# Patient Record
Sex: FEMALE | Race: BLACK OR AFRICAN AMERICAN | Employment: FULL TIME | URBAN - METROPOLITAN AREA
[De-identification: names, ages, dates, MRNs, and addresses within clinical notes are randomized per-mention and may not be internally consistent; named-entity substitution may affect disease eponyms.]

---

## 2017-03-24 ENCOUNTER — HOSPITAL ENCOUNTER (OUTPATIENT)
Dept: GENERAL RADIOLOGY | Age: 42
Discharge: HOME | End: 2017-03-24
Payer: MEDICAID

## 2017-03-24 DIAGNOSIS — R61 NIGHT SWEATS: ICD-10-CM

## 2017-03-24 DIAGNOSIS — R07.9 CHEST PAIN: ICD-10-CM

## 2017-03-24 PROCEDURE — 71020 XR CHEST PA LAT: CPT

## 2018-03-14 ENCOUNTER — HOSPITAL ENCOUNTER (EMERGENCY)
Age: 43
Discharge: HOME | End: 2018-03-14
Attending: EMERGENCY MEDICINE | Admitting: EMERGENCY MEDICINE
Payer: SELF-PAY

## 2018-03-14 VITALS
HEART RATE: 100 BPM | SYSTOLIC BLOOD PRESSURE: 157 MMHG | DIASTOLIC BLOOD PRESSURE: 89 MMHG | HEIGHT: 62 IN | WEIGHT: 142 LBS | RESPIRATION RATE: 18 BRPM | BODY MASS INDEX: 26.13 KG/M2 | TEMPERATURE: 100.1 F | OXYGEN SATURATION: 98 %

## 2018-03-14 DIAGNOSIS — R03.0 ELEVATED BLOOD PRESSURE READING: Primary | ICD-10-CM

## 2018-03-14 DIAGNOSIS — Z72.0 TOBACCO USE: ICD-10-CM

## 2018-03-14 PROCEDURE — 99282 EMERGENCY DEPT VISIT SF MDM: CPT

## 2018-03-14 NOTE — ED NOTES
The L.C. spoke with the client over the phone and discussed their follow up options. Due to the client not having insurance at the present they are concerned about medical bills. The [employer] will make an attempt to mail out the Jose L Lua along with the contact number for Call [address] [place] [address] for their minor child.

## 2018-03-14 NOTE — ED PROVIDER NOTES
EMERGENCY DEPARTMENT HISTORY AND PHYSICAL EXAM    12:31 PM      Date: 3/14/2018  Patient Name: Taylor Angulo    History of Presenting Illness     Chief Complaint   Patient presents with    Well Woman         History Provided By: Patient    Chief Complaint: Work note  Duration:  N/A  Timing:  N/A  Location: n/a  Quality: n/a  Severity: N/A  Modifying Factors: none  Associated Symptoms: denies any other associated signs or symptoms      Additional History (Context): Taylor Angulo is a 44 y.o. female with anxiety and PID who presents to the ED to be evaluated for for pre employment physical. 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 Now the pt is trying to apply for another LPN position but she needs a doctor to says she physically and mentally able to work. Reports she does see a therapist Dr. Chelsea Neri; who wrote a note saying the pt is mentally stable to work but he is unable to say if she can physically work. She sees her therapist 3x times a year. She states she didn't take this to a PCP due to her having to leave her last job she no longer has benefits; but she really needs this signed off so she can beable to work again. Otherwise she has been feeling fine and has no complaints of pain. Only medication she is on is xanx 0.5mg BID PO. Admits to being a current smoker for the past 20 years; she now trying to quit has on nicotine patch. Denies any symptoms. No other complaints or concerns in the ED. As the patient is without physical symptoms or complaints of pain, there is no severity of pain, quality of pain, duration, modifying factors, or associated signs and symptoms regarding the pt's presenting complaint. PCP: Vladislav Hernandez MD    Current Outpatient Prescriptions   Medication Sig Dispense Refill    ALPRAZolam (XANAX) 0.5 mg tablet Take 0.5 mg by mouth.          Past History     Past Medical History:  Past Medical History:   Diagnosis Date    Anxiety     Pelvic inflammatory disease     Psychiatric disorder        Past Surgical History:  No past surgical history on file. Family History:  Family History   Problem Relation Age of Onset    Heart Disease Other        Social History:  Social History   Substance Use Topics    Smoking status: Current Every Day Smoker     Packs/day: 1.00    Smokeless tobacco: Not on file    Alcohol use Yes       Allergies:  No Known Allergies      Review of Systems       Review of Systems   Constitutional: Negative for activity change, fatigue and fever. HENT: Negative for congestion and rhinorrhea. Eyes: Negative for visual disturbance. Respiratory: Negative for shortness of breath. Cardiovascular: Negative for chest pain and palpitations. Gastrointestinal: Negative for abdominal pain, diarrhea, nausea and vomiting. Genitourinary: Negative for dysuria and hematuria. Musculoskeletal: Negative for back pain. Skin: Negative for rash. Neurological: Negative for dizziness, weakness and light-headedness. All other systems reviewed and are negative. Physical Exam     Visit Vitals    /89 (BP 1 Location: Left arm, BP Patient Position: At rest)    Pulse 100    Temp 100.1 °F (37.8 °C)    Resp 18    Ht 5' 2\" (1.575 m)    Wt 64.4 kg (142 lb)    SpO2 98%    BMI 25.97 kg/m2         Physical Exam   Constitutional: She is oriented to person, place, and time. She appears well-developed and well-nourished. No distress. HENT:   Head: Normocephalic and atraumatic. Right Ear: External ear normal.   Left Ear: External ear normal.   Nose: Nose normal.   Mouth/Throat: Oropharynx is clear and moist.   Eyes: Conjunctivae and EOM are normal. Pupils are equal, round, and reactive to light. No scleral icterus. Neck: Normal range of motion. Neck supple. No JVD present. No tracheal deviation present. No thyromegaly present.    Cardiovascular: Normal rate, regular rhythm, normal heart sounds and intact distal pulses. Exam reveals no gallop and no friction rub. No murmur heard. Pulmonary/Chest: Effort normal and breath sounds normal. She exhibits no tenderness. Abdominal: Soft. Bowel sounds are normal. She exhibits no distension. There is no tenderness. There is no rebound and no guarding. Musculoskeletal: Normal range of motion. She exhibits no edema or tenderness. Lymphadenopathy:     She has no cervical adenopathy. Neurological: She is alert and oriented to person, place, and time. No cranial nerve deficit. Coordination normal.   No sensory loss, Gait normal, Motor 5/5   Skin: Skin is warm and dry. Psychiatric: She has a normal mood and affect. Her behavior is normal. Judgment and thought content normal.   Denies SI or HI   Nursing note and vitals reviewed. Diagnostic Study Results     Labs -  No results found for this or any previous visit (from the past 12 hour(s)). Radiologic Studies -   No orders to display         Medical Decision Making   I am the first provider for this patient. I reviewed the vital signs, available nursing notes, past medical history, past surgical history, family history and social history. Vital Signs-Reviewed the patient's vital signs. Pulse Oximetry Analysis -  98 on room air (Interpretation) normal.    Records Reviewed: Nursing Notes and Old Medical Records (Time of Review: 12:31 PM)    Provider Notes (Medical Decision Making): Pt is a 38yo female smoker with a hx of PTSD, anxiety that presents with a request for a pre employment evaluations. She notes that she is in a position where she cannot afford a PMD and needs to get to agency work so she felt as this was her only option. Pt is without complaint and was transparent about sharing her previous behavioral records from behavioral provider 4/2017. It appears she was cleared to return to work. Pt has 2 forms to attest to her health.   I can reasonably state that she appears able to work. I have asked her to be seen by a primary provider in the next 30 days for a reevaluation and she agrees. Pt BP is mildly elevated and will need to be followed. The  will assist in arranging her care. Eddie Noguera, DO 12:42 PM    Diagnosis     Clinical Impression:   1. Elevated blood pressure reading    2. Tobacco use        Disposition: D/C home    Follow-up Information     Follow up With Details Comments [address] in 1 week Follow up, As needed [address] [place], [place] [id number]  [address] [place] [address] Call Follow up, As needed [address] [facility]  [phone]           Patient's Medications   Start Taking    No medications on file   Continue Taking    ALPRAZOLAM (XANAX) 0.5 MG TABLET    Take 0.5 mg by mouth. These Medications have changed    No medications on file   Stop Taking    No medications on file     _______________________________    Attestations:  [address] [facility] acting as a scribe for and in the presence of Mckenna Nielson MD      March 14, 2018 at 12:31 PM       Provider Attestation:      I personally performed the services described in the documentation, reviewed the documentation, as recorded by the scribe in my presence, and it accurately and completely records my words and actions.  March 14, 2018 at 12:31 PM - Mckenna Nielson MD    _______________________________

## 2018-03-14 NOTE — DISCHARGE INSTRUCTIONS
Elevated Blood Pressure: Care Instructions  Your Care Instructions    Blood pressure is a measure of how hard the blood pushes against the walls of your arteries. It's normal for blood pressure to go up and down throughout the day. But if it stays up over time, you have high blood pressure. Two numbers tell you your blood pressure. The first number is the systolic pressure. It shows how hard the blood pushes when your heart is pumping. The second number is the diastolic pressure. It shows how hard the blood pushes between heartbeats, when your heart is relaxed and filling with blood. An ideal blood pressure in adults is less than 120/80 (say \"120 over 80\"). High blood pressure is 140/90 or higher. You have high blood pressure if your top number is 140 or higher or your bottom number is 90 or higher, or both. The main test for high blood pressure is simple, fast, and painless. To diagnose high blood pressure, your doctor will test your blood pressure at different times. After testing your blood pressure, your doctor may ask you to test it again when you are home. If you are diagnosed with high blood pressure, you can work with your doctor to make a long-term plan to manage it. Follow-up care is a key part of your treatment and safety. Be sure to make and go to all appointments, and call your doctor if you are having problems. It's also a good idea to know your test results and keep a list of the medicines you take. How can you care for yourself at home? · Do not smoke. Smoking increases your risk for heart attack and stroke. If you need help quitting, talk to your doctor about stop-smoking programs and medicines. These can increase your chances of quitting for good. · Stay at a healthy weight. · Try to limit how much sodium you eat to less than 2,300 milligrams (mg) a day. Your doctor may ask you to try to eat less than 1,500 mg a day. · Be physically active.  Get at least 30 minutes of exercise on most days of the week. Walking is a good choice. You also may want to do other activities, such as running, swimming, cycling, or playing tennis or team sports. · Avoid or limit alcohol. Talk to your doctor about whether you can drink any alcohol. · Eat plenty of fruits, vegetables, and low-fat dairy products. Eat less saturated and total fats. · Learn how to check your blood pressure at home. When should you call for help? Call your doctor now or seek immediate medical care if:  ? · Your blood pressure is much higher than normal (such as 180/110 or higher). ? · You think high blood pressure is causing symptoms such as:  ¨ Severe headache. ¨ Blurry vision. ? Watch closely for changes in your health, and be sure to contact your doctor if:  ? · You do not get better as expected. Where can you learn more? Go to http://hoKidlandiadiana.info/. Enter M124 in the search box to learn more about \"Elevated Blood Pressure: Care Instructions. \"  Current as of: September 21, 2016  Content Version: 11.4  © 0321-1763 [employer]. Care instructions adapted under license by [employer] (which disclaims liability or warranty for this information). If you have questions about a medical condition or this instruction, always ask your healthcare professional. [address] any warranty or liability for your use of this information. Stopping Smoking: Care Instructions  Your Care Instructions    Cigarette smokers crave the nicotine in cigarettes. Giving it up is much harder than simply changing a habit. Your body has to stop craving the nicotine. It is hard to quit, but you can do it. There are many tools that people use to quit smoking. You may find that combining tools works best for you. There are several steps to quitting. First you get ready to quit. Then you get support to help you. After that, you learn new skills and behaviors to become a nonsmoker.  For many people, a necessary step is getting and using medicine. Your doctor will help you set up the plan that best meets your needs. You may want to attend a smoking cessation program to help you quit smoking. When you choose a program, look for one that has proven success. Ask your doctor for ideas. You will greatly increase your chances of success if you take medicine as well as get counseling or join a cessation program.  Some of the changes you feel when you first quit tobacco are uncomfortable. Your body will miss the nicotine at first, and you may feel short-tempered and grumpy. You may have trouble sleeping or concentrating. Medicine can help you deal with these symptoms. You may struggle with changing your smoking habits and rituals. The last step is the tricky one: Be prepared for the smoking urge to continue for a time. This is a lot to deal with, but keep at it. You will feel better. Follow-up care is a key part of your treatment and safety. Be sure to make and go to all appointments, and call your doctor if you are having problems. It's also a good idea to know your test results and keep a list of the medicines you take. How can you care for yourself at home? · Ask your family, friends, and coworkers for support. You have a better chance of quitting if you have help and support. · Join a support group, such as Nicotine Anonymous, for people who are trying to quit smoking. · Consider signing up for a smoking cessation program, such as the American Lung Association's Freedom from Smoking program.  · Set a quit date. Pick your date carefully so that it is not right in the middle of a big deadline or stressful time. Once you quit, do not even take a puff. Get rid of all ashtrays and lighters after your last cigarette. Clean your house and your clothes so that they do not smell of smoke. · Learn how to be a nonsmoker.  Think about ways you can avoid those things that make you reach for a cigarette. ¨ Avoid situations that put you at greatest risk for smoking. For some people, it is hard to have a drink with friends without smoking. For others, they might skip a coffee break with coworkers who smoke. ¨ Change your daily routine. Take a different route to work or eat a meal in a different place. · Cut down on stress. Calm yourself or release tension by doing an activity you enjoy, such as reading a book, taking a hot bath, or gardening. · Talk to your doctor or pharmacist about nicotine replacement therapy, which replaces the nicotine in your body. You still get nicotine but you do not use tobacco. Nicotine replacement products help you slowly reduce the amount of nicotine you need. These products come in several forms, many of them available over-the-counter:  ¨ Nicotine patches  ¨ Nicotine gum and lozenges  ¨ Nicotine inhaler  · Ask your doctor about bupropion (Wellbutrin) or varenicline (Chantix), which are prescription medicines. They do not contain nicotine. They help you by reducing withdrawal symptoms, such as stress and anxiety. · Some people find hypnosis, acupuncture, and massage helpful for ending the smoking habit. · Eat a healthy diet and get regular exercise. Having healthy habits will help your body move past its craving for nicotine. · Be prepared to keep trying. Most people are not successful the first few times they try to quit. Do not get mad at yourself if you smoke again. Make a list of things you learned and think about when you want to try again, such as next week, next month, or next year. Where can you learn more? Go to http://ho-diana.info/. Enter R619 in the search box to learn more about \"Stopping Smoking: Care Instructions. \"  Current as of: March 20, 2017  Content Version: 11.4  © 8316-7163 [employer].  Care instructions adapted under license by [employer] (which disclaims liability or warranty for this information). If you have questions about a medical condition or this instruction, always ask your healthcare professional. [place] [address] any warranty or liability for your use of this information.

## 2018-03-14 NOTE — Clinical Note
Requires reevaluation in 1 week regarding BP and full reevaluation in 30 days in order to continue working.

## 2018-03-14 NOTE — ED TRIAGE NOTES
Pt presents to ED for work note.  Pt reports that she needs to be evaluated for pre-employment physical.

## 2018-03-23 ENCOUNTER — HOSPITAL ENCOUNTER (EMERGENCY)
Age: 43
Discharge: HOME | End: 2018-03-23
Attending: EMERGENCY MEDICINE | Admitting: EMERGENCY MEDICINE
Payer: SELF-PAY

## 2018-03-23 ENCOUNTER — APPOINTMENT (OUTPATIENT)
Dept: GENERAL RADIOLOGY | Age: 43
End: 2018-03-23
Attending: EMERGENCY MEDICINE
Payer: SELF-PAY

## 2018-03-23 VITALS
TEMPERATURE: 98.1 F | SYSTOLIC BLOOD PRESSURE: 125 MMHG | BODY MASS INDEX: 26.13 KG/M2 | HEIGHT: 62 IN | DIASTOLIC BLOOD PRESSURE: 78 MMHG | HEART RATE: 73 BPM | OXYGEN SATURATION: 100 % | RESPIRATION RATE: 16 BRPM | WEIGHT: 142 LBS

## 2018-03-23 DIAGNOSIS — J20.9 ACUTE BRONCHITIS, UNSPECIFIED ORGANISM: Primary | ICD-10-CM

## 2018-03-23 DIAGNOSIS — Z72.0 TOBACCO USE: ICD-10-CM

## 2018-03-23 PROCEDURE — 99283 EMERGENCY DEPT VISIT LOW MDM: CPT

## 2018-03-23 PROCEDURE — 94640 AIRWAY INHALATION TREATMENT: CPT

## 2018-03-23 PROCEDURE — 74011636637 HC RX REV CODE- 636/637: Performed by: EMERGENCY MEDICINE

## 2018-03-23 PROCEDURE — 74011000250 HC RX REV CODE- 250: Performed by: EMERGENCY MEDICINE

## 2018-03-23 PROCEDURE — 71046 X-RAY EXAM CHEST 2 VIEWS: CPT

## 2018-03-23 RX ORDER — PREDNISONE 20 MG/1
60 TABLET ORAL DAILY
Qty: 21 TAB | Refills: 0 | Status: SHIPPED | OUTPATIENT
Start: 2018-03-23 | End: 2018-03-28

## 2018-03-23 RX ORDER — IPRATROPIUM BROMIDE AND ALBUTEROL SULFATE 2.5; .5 MG/3ML; MG/3ML
3 SOLUTION RESPIRATORY (INHALATION)
Status: COMPLETED | OUTPATIENT
Start: 2018-03-23 | End: 2018-03-23

## 2018-03-23 RX ORDER — ALBUTEROL SULFATE 0.83 MG/ML
5 SOLUTION RESPIRATORY (INHALATION)
Status: COMPLETED | OUTPATIENT
Start: 2018-03-23 | End: 2018-03-23

## 2018-03-23 RX ORDER — ALBUTEROL SULFATE 90 UG/1
1 AEROSOL, METERED RESPIRATORY (INHALATION)
Qty: 1 INHALER | Refills: 0 | Status: SHIPPED | OUTPATIENT
Start: 2018-03-23 | End: 2019-12-02

## 2018-03-23 RX ORDER — PREDNISONE 20 MG/1
60 TABLET ORAL
Status: COMPLETED | OUTPATIENT
Start: 2018-03-23 | End: 2018-03-23

## 2018-03-23 RX ORDER — LEVOFLOXACIN 750 MG/1
750 TABLET ORAL DAILY
Qty: 5 TAB | Refills: 0 | Status: SHIPPED | OUTPATIENT
Start: 2018-03-23 | End: 2019-12-02

## 2018-03-23 RX ADMIN — PREDNISONE 60 MG: 20 TABLET ORAL at 10:19

## 2018-03-23 RX ADMIN — ALBUTEROL SULFATE 5 MG: 2.5 SOLUTION RESPIRATORY (INHALATION) at 10:37

## 2018-03-23 RX ADMIN — IPRATROPIUM BROMIDE AND ALBUTEROL SULFATE 3 ML: .5; 3 SOLUTION RESPIRATORY (INHALATION) at 10:37

## 2018-03-23 NOTE — ED PROVIDER NOTES
EMERGENCY DEPARTMENT HISTORY AND PHYSICAL EXAM    10:08 AM      Date: 3/23/2018  Patient Name: Chloé Sawyer    History of Presenting Illness     Chief Complaint   Patient presents with    Cough         History Provided By: Patient    Chief Complaint: Cough   Duration: 3 Weeks  Timing:  Constant  Quality: productive, and white mucus   Severity: Moderate  Modifying Factors: Tried Prednisone, NyQuil, Mucinex, no relief of Sx   Associated Symptoms: fatigue, postnasal drip       Additional History (Context): Chloé Sawyer is a 44 y.o. female with PMHx of anxiety who presents c/o a constant moderate productive cough onset x 3 weeks ago. Pt states the color of the for the past x 3 weeks she has been \"fighting a cold\". Associated Sx include fatigue and postnasal drip. Pt states she has an inhaler and Prednisone. On Prednisone x 5 days. She also tried NyQuil and Mucinex, no relief of Sx. Last dose of Mucinex was \"this morning\". Pt admits that she is a 20 year smoker. Denies any further complaints or symptoms at the moment. PCP: Vladislav Hernandez MD        Past History     Past Medical History:  Past Medical History:   Diagnosis Date    Anxiety     Pelvic inflammatory disease     Psychiatric disorder        Past Surgical History:  History reviewed. No pertinent surgical history. Family History:  Family History   Problem Relation Age of Onset    Heart Disease Other        Social History:  Social History   Substance Use Topics    Smoking status: Current Every Day Smoker     Packs/day: 1.00    Smokeless tobacco: None    Alcohol use Yes       Allergies:  No Known Allergies      Review of Systems       Review of Systems   Constitutional: Positive for fatigue. HENT: Positive for postnasal drip. Respiratory: Positive for cough. All other systems reviewed and are negative.         Physical Exam     Visit Vitals    /78    Pulse 73    Temp 98.1 °F (36.7 °C)    Resp 16    Ht 5' 2\" (1.575 m)    Wt 64.4 kg (142 lb)    LMP 03/14/2018    SpO2 100%    BMI 25.97 kg/m2         Physical Exam   Constitutional:   General:  Well-developed, well-nourished, appears mildly distressed, not warm to touch nontoxic nondiaphoretic. Head:  Normocephalic atraumatic. Eyes:  Pupils midrange extraocular movements intact. No pallor or conjunctival injection. Nose:  No rhinorrhea, inspection grossly normal.    Ears:  Grossly normal to inspection, no discharge. Mouth:  Mucous membranes moist, no appreciable intraoral lesion. Neck/Back:  Trachea midline, no asymmetry. Chest:  Grossly normal inspection, symmetric chest rise. Pulmonary: Wheezing all lung fields no focal rhonchi speaking in full sentences. Cardiovascular:  S1-S2 no murmurs rubs or gallops. Abdomen: Soft, nontender, nondistended no guarding rebound or peritoneal signs. Extremities:  Grossly normal to inspection, peripheral pulses intact  no distal edema no pain on palpation and No asymmetry  Neurologic:  Alert and oriented no appreciable focal neurologic deficit. Skin:  Warm and dry  Psychiatric:  Grossly normal mood and affect. Nursing note reviewed, vital signs reviewed. Diagnostic Study Results     Labs -  No results found for this or any previous visit (from the past 12 hour(s)). Radiologic Studies -   XR CHEST PA LAT   Final Result      CXR  IMPRESSION:  No acute findings. Medical Decision Making   I am the first provider for this patient. I reviewed the vital signs, available nursing notes, past medical history, past surgical history, family history and social history. Vital Signs-Reviewed the patient's vital signs.     Pulse Oximetry Analysis -  100 % on RA, normal     Records Reviewed: Nursing Notes (Time of Review: 10:08 AM)    ED Course: Progress Notes, Reevaluation, and Consults:    ED course:  Patient presents with persistent cough ×1 month, with productive sputum, difficulty getting her secretions up in the morning. She is afebrile and not tachycardic saturation normal on room air, clinically he has a COPD exacerbation, no history of COPD, may have a bronchitis but more likely it's related to her 20 year smoking history. She doesn't have a primary care doctor, she is working with LEFT coached to establish care but she acknowledges that she has type and screen financial constraint. She will be referred back to , chris Lacey that she understands the importance of follow-up    Offered an IV line she declined she did not have blood work done she requested only have breathing treatments steroids by mouth and a chest x-ray. It seems reasonable    Auscultation she has persistent wheezing although improved aeration. Discussed further breathing treatment she declined that at this time. Discussed need for outpatient management for diagnosis of COPD or heart failure. She did not want a 1st evaluation here which seems reasonable, we'll discharge with albuterol steroids and Levaquin for possible bacterial exacerbation of COPD with active tobacco use    Patient's presentation, history, physical exam and laboratory evaluations were reviewed. At this time patient was felt to be stable for outpatient management and follow with primary care/specialist.  Patient was instructed to return to the emergency department with any concerns. Disposition:    Discharged home      Portions of this chart were created with Dragon medical speech to text program.   Unrecognized errors may be present. Diagnosis     Clinical Impression:   1. Acute bronchitis, unspecified organism    2. Tobacco use        Disposition: discharged.      Follow-up Information     Follow up With Details Comments [address] Call in 2 days  [address] EMERGENCY DEPT  As needed, If symptoms worsen [address] [facility]  [phone] [address] Call in 2 days  [facility] [address]  [phone]           Patient's Medications   Start Taking    ALBUTEROL (PROVENTIL HFA) 90 MCG/ACTUATION INHALER    Take 1 Puff by inhalation every four (4) hours as needed for Wheezing. LEVOFLOXACIN (LEVAQUIN) 750 MG TABLET    Take 1 Tab by mouth daily. PREDNISONE (DELTASONE) 20 MG TABLET    Take 3 Tabs by mouth daily for 5 days. Then 40 mg by mouth for 2 days, followed by 20mg by mouth for 2 days   Continue Taking    No medications on file   These Medications have changed    No medications on file   Stop Taking    ALPRAZOLAM (XANAX) 0.5 MG TABLET    Take 0.5 mg by mouth.     _______________________________    Attestations:  Scribe Attestation     Charo Rodriguez (Aj) acting as a scribe for and in the presence of Magnolia Jacobson MD      March 23, 2018 at 10:08 AM       Provider Attestation:      I personally performed the services described in the documentation, reviewed the documentation, as recorded by the scribe in my presence, and it accurately and completely records my words and actions.  March 23, 2018 at 10:08 AM - Magnolia Jacobson MD    _______________________________

## 2018-03-23 NOTE — DISCHARGE INSTRUCTIONS
Bronchitis: Care Instructions  Your Care Instructions    Bronchitis is inflammation of the bronchial tubes, which carry air to the lungs. The tubes swell and produce mucus, or phlegm. The mucus and inflamed bronchial tubes make you cough. You may have trouble breathing. Most cases of bronchitis are caused by viruses like those that cause colds. Antibiotics usually do not help and they may be harmful. Bronchitis usually develops rapidly and lasts about 2 to 3 weeks in otherwise healthy people. Follow-up care is a key part of your treatment and safety. Be sure to make and go to all appointments, and call your doctor if you are having problems. It's also a good idea to know your test results and keep a list of the medicines you take. How can you care for yourself at home? · Take all medicines exactly as prescribed. Call your doctor if you think you are having a problem with your medicine. · Get some extra rest.  · Take an over-the-counter pain medicine, such as acetaminophen (Tylenol), ibuprofen (Advil, Motrin), or naproxen (Aleve) to reduce fever and relieve body aches. Read and follow all instructions on the label. · Do not take two or more pain medicines at the same time unless the doctor told you to. Many pain medicines have acetaminophen, which is Tylenol. Too much acetaminophen (Tylenol) can be harmful. · Take an over-the-counter cough medicine that contains dextromethorphan to help quiet a dry, hacking cough so that you can sleep. Avoid cough medicines that have more than one active ingredient. Read and follow all instructions on the label. · Breathe moist air from a humidifier, hot shower, or sink filled with hot water. The heat and moisture will thin mucus so you can cough it out. · Do not smoke. Smoking can make bronchitis worse. If you need help quitting, talk to your doctor about stop-smoking programs and medicines. These can increase your chances of quitting for good.   When should you call for help? Call 911 anytime you think you may need emergency care. For example, call if:  ? · You have severe trouble breathing. ?Call your doctor now or seek immediate medical care if:  ? · You have new or worse trouble breathing. ? · You cough up dark brown or bloody mucus (sputum). ? · You have a new or higher fever. ? · You have a new rash. ? Watch closely for changes in your health, and be sure to contact your doctor if:  ? · You cough more deeply or more often, especially if you notice more mucus or a change in the color of your mucus. ? · You are not getting better as expected. Where can you learn more? Go to http://ho-diana.info/. Enter H333 in the search box to learn more about \"Bronchitis: Care Instructions. \"  Current as of: May 12, 2017  Content Version: 11.4  © 0273-4951 [employer]. Care instructions adapted under license by [employer] (which disclaims liability or warranty for this information). If you have questions about a medical condition or this instruction, always ask your healthcare professional. [place] [address] any warranty or liability for your use of this information. Learning About Benefits From Quitting Smoking  How does quitting smoking make you healthier? If you're thinking about quitting smoking, you may have a few reasons to be smoke-free. Your health may be one of them. · When you quit smoking, you lower your risks for cancer, lung disease, heart attack, stroke, blood vessel disease, and blindness from macular degeneration. · When you're smoke-free, you get sick less often, and you heal faster. You are less likely to get colds, flu, bronchitis, and pneumonia. · As a nonsmoker, you may find that your mood is better and you are less stressed. When and how will you feel healthier? Quitting has real health benefits that start from day 1 of being smoke-free.  And the longer you stay smoke-free, the healthier you get and the better you feel. The first hours  · After just 20 minutes, your blood pressure and heart rate go down. That means there's less stress on your heart and blood vessels. · Within 12 hours, the level of carbon monoxide in your blood drops back to normal. That makes room for more oxygen. With more oxygen in your body, you may notice that you have more energy than when you smoked. After 2 weeks  · Your lungs start to work better. · Your risk of heart attack starts to drop. After 1 month  · When your lungs are clear, you cough less and breathe deeper, so it's easier to be active. · Your sense of taste and smell return. That means you can enjoy food more than you have since you started smoking. Over the years  · After 1 year, your risk of heart disease is half what it would be if you kept smoking. · After 5 years, your risk of stroke starts to shrink. Within a few years after that, it's about the same as if you'd never smoked. · After 10 years, your risk of dying from lung cancer is cut by about half. And your risk for many other types of cancer is lower too. How would quitting help others in your life? When you quit smoking, you improve the health of everyone who now breathes in your smoke. · Their heart, lung, and cancer risks drop, much like yours. · They are sick less. For babies and small children, living smoke-free means they're less likely to have ear infections, pneumonia, and bronchitis. · If you're a woman who is or will be pregnant someday, quitting smoking means a healthier . · Children who are close to you are less likely to become adult smokers. Where can you learn more? Go to http://ho-diana.info/. Enter [id number] [id number] [id number] in the search box to learn more about \"Learning About Benefits From Quitting Smoking. \"  Current as of: 2017  Content Version: 11.4  © 5019-8349 [employer] Inc[employer].  Care instructions adapted under license by [employer] (which disclaims liability or warranty for this information). If you have questions about a medical condition or this instruction, always ask your healthcare professional. [address] any warranty or liability for your use of this information.

## 2018-12-14 ENCOUNTER — HOSPITAL ENCOUNTER (EMERGENCY)
Age: 43
Discharge: HOME | End: 2018-12-14
Attending: EMERGENCY MEDICINE | Admitting: EMERGENCY MEDICINE
Payer: SELF-PAY

## 2018-12-14 VITALS
DIASTOLIC BLOOD PRESSURE: 70 MMHG | RESPIRATION RATE: 16 BRPM | OXYGEN SATURATION: 99 % | BODY MASS INDEX: 27.6 KG/M2 | WEIGHT: 150 LBS | HEIGHT: 62 IN | TEMPERATURE: 98.6 F | HEART RATE: 85 BPM | SYSTOLIC BLOOD PRESSURE: 116 MMHG

## 2018-12-14 DIAGNOSIS — Z11.3 ROUTINE SCREENING FOR STI (SEXUALLY TRANSMITTED INFECTION): Primary | ICD-10-CM

## 2018-12-14 LAB
APPEARANCE UR: CLEAR
BILIRUB UR QL: NEGATIVE
COLOR UR: YELLOW
GLUCOSE UR STRIP.AUTO-MCNC: NEGATIVE MG/DL
HCG UR QL: NEGATIVE
HGB UR QL STRIP: NEGATIVE
KETONES UR QL STRIP.AUTO: NEGATIVE MG/DL
LEUKOCYTE ESTERASE UR QL STRIP.AUTO: NEGATIVE
NITRITE UR QL STRIP.AUTO: NEGATIVE
PH UR STRIP: 6 [PH] (ref 5–8)
PROT UR STRIP-MCNC: NEGATIVE MG/DL
SP GR UR REFRACTOMETRY: 1.02 (ref 1–1.03)
UROBILINOGEN UR QL STRIP.AUTO: 0.2 EU/DL (ref 0.2–1)

## 2018-12-14 PROCEDURE — 87591 N.GONORRHOEAE DNA AMP PROB: CPT

## 2018-12-14 PROCEDURE — 74011250636 HC RX REV CODE- 250/636: Performed by: PHYSICIAN ASSISTANT

## 2018-12-14 PROCEDURE — 96372 THER/PROPH/DIAG INJ SC/IM: CPT

## 2018-12-14 PROCEDURE — 81025 URINE PREGNANCY TEST: CPT

## 2018-12-14 PROCEDURE — 99283 EMERGENCY DEPT VISIT LOW MDM: CPT

## 2018-12-14 PROCEDURE — 74011250637 HC RX REV CODE- 250/637: Performed by: PHYSICIAN ASSISTANT

## 2018-12-14 PROCEDURE — 81003 URINALYSIS AUTO W/O SCOPE: CPT

## 2018-12-14 RX ORDER — METRONIDAZOLE 500 MG/1
2000 TABLET ORAL
Status: COMPLETED | OUTPATIENT
Start: 2018-12-14 | End: 2018-12-14

## 2018-12-14 RX ORDER — ONDANSETRON 4 MG/1
4 TABLET, ORALLY DISINTEGRATING ORAL
Status: COMPLETED | OUTPATIENT
Start: 2018-12-14 | End: 2018-12-14

## 2018-12-14 RX ORDER — AZITHROMYCIN 250 MG/1
1000 TABLET, FILM COATED ORAL
Status: COMPLETED | OUTPATIENT
Start: 2018-12-14 | End: 2018-12-14

## 2018-12-14 RX ADMIN — AZITHROMYCIN 1000 MG: 250 TABLET, FILM COATED ORAL at 12:23

## 2018-12-14 RX ADMIN — LIDOCAINE HYDROCHLORIDE 250 MG: 10 INJECTION, SOLUTION EPIDURAL; INFILTRATION; INTRACAUDAL; PERINEURAL at 12:26

## 2018-12-14 RX ADMIN — METRONIDAZOLE 2000 MG: 500 TABLET ORAL at 12:24

## 2018-12-14 RX ADMIN — ONDANSETRON 4 MG: 4 TABLET, ORALLY DISINTEGRATING ORAL at 12:20

## 2018-12-14 NOTE — ED PROVIDER NOTES
EMERGENCY DEPARTMENT HISTORY AND PHYSICAL EXAM    11:12 AM      Date: 12/14/2018  Patient Name: Washington Burnette    History of Presenting Illness     Chief Complaint   Patient presents with    Vaginal Discharge         History Provided By: Patient    Chief Complaint: \"treatment for known STD\"  Duration:  N/A  Timing:  N/A  Location:   Quality: Tightness  Severity: N/A  Modifying Factors: \"boyfriend is being treated for Trich\"  Associated Symptoms: denies any other associated signs or symptoms      Additional History (Context): Washington Burnette is a 44 y.o. female with No significant past medical history who presents for STI evaluation with mild vaginal discharge that is malodorous and pruritic. The pt states her ex-boyfriend informed her last night that one of his sexual partners was being treated for Trichomoniasis. The pt denies fever, chills, weight loss, CP, SOB, abdominal pain, and any other associated sxs. [place] PCP: Vladislav Hernandez MD    Current Outpatient Medications   Medication Sig Dispense Refill    albuterol (PROVENTIL HFA) 90 mcg/actuation inhaler Take 1 Puff by inhalation every four (4) hours as needed for Wheezing. 1 Inhaler 0    levoFLOXacin (LEVAQUIN) 750 mg tablet Take 1 Tab by mouth daily. 5 Tab 0       Past History     Past Medical History:  Past Medical History:   Diagnosis Date    Anxiety     Pelvic inflammatory disease     Psychiatric disorder        Past Surgical History:  History reviewed. No pertinent surgical history. Family History:  Family History   Problem Relation Age of Onset    Heart Disease Other        Social History:  Social History     Tobacco Use    Smoking status: Current Every Day Smoker     Packs/day: 1.00   Substance Use Topics    Alcohol use: Yes    Drug use: Not on file       Allergies:  No Known Allergies      Review of Systems     Review of Systems   Constitutional: Negative for chills, fever and unexpected weight change.    Respiratory: Negative for shortness of breath. Cardiovascular: Negative for chest pain. Gastrointestinal: Negative for abdominal pain. Genitourinary: Positive for vaginal discharge. Negative for dysuria and frequency. All other systems reviewed and are negative. Physical Exam     Visit Vitals  /70 (BP 1 Location: Left arm, BP Patient Position: At rest)   Pulse 85   Temp 98.6 °F (37 °C)   Resp 16   Ht 5' 2\" (1.575 m)   Wt 68 kg (150 lb)   SpO2 99%   BMI 27.44 kg/m²       Physical Exam   Constitutional: She appears well-developed and well-nourished. No distress. HENT:   Head: Normocephalic and atraumatic. Neck: Normal range of motion. Neck supple. Cardiovascular: Normal rate, regular rhythm, normal heart sounds and intact distal pulses. Exam reveals no gallop and no friction rub. No murmur heard. Pulmonary/Chest: Effort normal and breath sounds normal. No respiratory distress. She has no wheezes. She has no rales. Abdominal: Soft. She exhibits no distension. There is no tenderness. There is no rebound and no guarding. Genitourinary:   Genitourinary Comments: Declined    Musculoskeletal: Normal range of motion. Neurological: She is alert. Skin: Skin is warm. No rash noted. She is not diaphoretic. Nursing note and vitals reviewed.         Diagnostic Study Results     Labs -  Recent Results (from the past 12 hour(s))   URINALYSIS W/ RFLX MICROSCOPIC    Collection Time: 12/14/18 11:14 AM   Result Value Ref Range    Color YELLOW      Appearance CLEAR      Specific gravity 1.018 1.005 - 1.030      pH (UA) 6.0 5.0 - 8.0      Protein NEGATIVE  NEG mg/dL    Glucose NEGATIVE  NEG mg/dL    Ketone NEGATIVE  NEG mg/dL    Bilirubin NEGATIVE  NEG      Blood NEGATIVE  NEG      Urobilinogen 0.2 0.2 - 1.0 EU/dL    Nitrites NEGATIVE  NEG      Leukocyte Esterase NEGATIVE  NEG     HCG URINE, QL    Collection Time: 12/14/18 11:14 AM   Result Value Ref Range    HCG urine, QL NEGATIVE  NEG         Radiologic Studies -   No orders to display         Medical Decision Making   I am the first provider for this patient. I reviewed the vital signs, available nursing notes, past medical history, past surgical history, family history and social history. Vital Signs-Reviewed the patient's vital signs. Records Reviewed: Nursing Notes and Old Medical Records (Time of Review: 11:12 AM)    ED Course: Progress Notes, Reevaluation, and Consults:  11:38 AM: Reviewed results with patient. Discussed need for close outpatient follow-up. Provider Notes (Medical Decision Making): 43 yo F who presents due to exposure to STI. Pt notes vaginal discharge. Declining pelvic examination in the ED. Will send ct/ng/trich from urine. Will treat empirically. Stable for d/c with outpatient follow-up. Diagnosis     Clinical Impression:   1. Routine screening for STI (sexually transmitted infection)        Disposition: home     Follow-up Information     Follow up With Specialties Details Why Contact Cary Price  On 12/14/2018 SEE  FOR ASSISTANCE WITH INSURANCE ENROLLMENT,FOLLOW UP APPOINTMENT AND PRANAV CARE APPLICATION PROCESS [address] [place] [address] [address] [facility]  Schedule an appointment as soon as possible for a visit  [address] [place] [address]  [phone]              Medication List      ASK your doctor about these medications    albuterol 90 mcg/actuation inhaler  Commonly known as:  PROVENTIL HFA  Take 1 Puff by inhalation every four (4) hours as needed for Wheezing. levoFLOXacin 750 mg tablet  Commonly known as:  LEVAQUIN  Take 1 Tab by mouth daily.           _______________________________       Scribe Ke Michael acting as a scribe for and in the presence of Callie Montgomery  December 14, 2018 at 11:12 AM       Provider Attestation:      I personally performed the services described in the documentation, reviewed the documentation, as recorded by the scribe in my presence, and it accurately and completely records my words and actions.  December 14, 2018 at 11:12 AM - Candance Spore, PA-C          _______________________________

## 2018-12-14 NOTE — DISCHARGE INSTRUCTIONS
You were treated for exposure to a possible STD. No sexual activity for the next 2 weeks. Any sexual partner(s) should be checked for STD's as well. You should follow-up with the [facility] for any further testing for STDs, including HIV. Address: [address]  Telephone: [phone]     Exposure to Sexually Transmitted Infections: Care Instructions  Your Care Instructions  Sexually transmitted infections (STIs) are those diseases spread by sexual contact. There are at least 20 different STIs, including chlamydia, gonorrhea, syphilis, and human immunodeficiency virus (HIV), which causes AIDS. Bacteria-caused STIs can be treated and cured. STIs caused by viruses, such as HIV, can be treated but not cured. Some STIs can reduce a woman's chances of getting pregnant in the future. STIs are spread during sexual contact, such as vaginal intercourse and oral or anal sex. Follow-up care is a key part of your treatment and safety. Be sure to make and go to all appointments, and call your doctor if you are having problems. It's also a good idea to know your test results and keep a list of the medicines you take. How can you care for yourself at home? · Your doctor may have given you a shot of antibiotics. If your doctor prescribed antibiotic pills, take them as directed. Do not stop taking them just because you feel better. You need to take the full course of antibiotics. · Do not have sexual contact while you have symptoms of an STI or are being treated for an STI. · Tell your sex partner (or partners) that he or she will need treatment. · If you are a woman, do not douche. Douching changes the normal balance of bacteria in the vagina and may spread an infection up into your reproductive organs. To prevent exposure to STIs in the future  · Use latex condoms every time you have sex. Use them from the beginning to the end of sexual contact.   · Talk to your partner before you have sex. Find out if he or she has or is at risk for any STI. Keep in mind that a person may be able to spread an STI even if he or she does not have symptoms. · Do not have sex if you are being treated for an STI. · Do not have sex with anyone who has symptoms of an STI, such as sores on the genitals or mouth. · Having one sex partner (who does not have STIs and does not have sex with anyone else) is a good way to avoid STIs. When should you call for help? Call your doctor now or seek immediate medical care if:    · You have new pain in your belly or pelvis.     · You have symptoms of a urinary tract infection. These may include:  ? Pain or burning when you urinate. ? A frequent need to urinate without being able to pass much urine. ? Pain in the flank, which is just below the rib cage and above the waist on either side of the back. ? Blood in your urine. ? A fever.     · You have new or worsening pain or swelling in the scrotum.    Watch closely for changes in your health, and be sure to contact your doctor if:    · You have unusual vaginal bleeding.     · You have a discharge from the vagina or penis.     · You have any new symptoms, such as sores, bumps, rashes, blisters, or warts.     · You have itching, tingling, pain, or burning in the genital or anal area.     · You think you may have an STI. Where can you learn more? Go to http://ho-diana.info/. Enter C268 in the search box to learn more about \"Exposure to Sexually Transmitted Infections: Care Instructions. \"  Current as of: November 27, 2017  Content Version: 11.8  © 3373-2471 [employer]. Care instructions adapted under license by [employer] (which disclaims liability or warranty for this information).  If you have questions about a medical condition or this instruction, always ask your healthcare professional. [address] any warranty or liability for your use of this information.

## 2018-12-14 NOTE — ED NOTES
PATIENT WILL SEE  FOR ASSISTANCE WITH INSURANCE ENROLLMENT,FOLLOW UP APPOINTMENT AND PRANAV CARE APPLICATION PROCESS

## 2018-12-19 LAB
C TRACH RRNA SPEC QL NAA+PROBE: NEGATIVE
N GONORRHOEA RRNA SPEC QL NAA+PROBE: NEGATIVE
SPECIMEN SOURCE: NORMAL
T VAGINALIS RRNA VAG QL NAA+PROBE: NEGATIVE

## 2019-04-22 ENCOUNTER — HOSPITAL ENCOUNTER (EMERGENCY)
Age: 44
Discharge: LEFT BEFORE DISCHARGE | End: 2019-04-22
Attending: EMERGENCY MEDICINE | Admitting: EMERGENCY MEDICINE
Payer: MEDICAID

## 2019-04-22 VITALS
RESPIRATION RATE: 14 BRPM | WEIGHT: 140 LBS | HEART RATE: 93 BPM | HEIGHT: 62 IN | DIASTOLIC BLOOD PRESSURE: 85 MMHG | OXYGEN SATURATION: 100 % | TEMPERATURE: 98.4 F | SYSTOLIC BLOOD PRESSURE: 137 MMHG | BODY MASS INDEX: 25.76 KG/M2

## 2019-04-22 DIAGNOSIS — Z53.21 PATIENT LEFT WITHOUT BEING SEEN: Primary | ICD-10-CM

## 2019-04-22 PROCEDURE — 75810000275 HC EMERGENCY DEPT VISIT NO LEVEL OF CARE

## 2019-04-22 PROCEDURE — 94762 N-INVAS EAR/PLS OXIMTRY CONT: CPT

## 2019-04-22 NOTE — ED TRIAGE NOTES
\"I'm on amoxicillin for strep. My throat is still swollen and painful. My neck hurts and my face and arms feel numb and tingly. \"

## 2019-09-04 ENCOUNTER — HOSPITAL ENCOUNTER (OUTPATIENT)
Dept: CT IMAGING | Age: 44
Discharge: HOME | End: 2019-09-04
Attending: FAMILY MEDICINE
Payer: MEDICAID

## 2019-09-04 DIAGNOSIS — R19.00 ABDOMINAL MASS: ICD-10-CM

## 2019-09-04 DIAGNOSIS — R10.9 ABDOMINAL PAIN: ICD-10-CM

## 2019-09-04 DIAGNOSIS — D17.9 LIPOMA: ICD-10-CM

## 2019-09-04 PROCEDURE — 74177 CT ABD & PELVIS W/CONTRAST: CPT

## 2019-09-04 PROCEDURE — 74011636320 HC RX REV CODE- 636/320: Performed by: FAMILY MEDICINE

## 2019-09-04 RX ADMIN — IOPAMIDOL 100 ML: 612 INJECTION, SOLUTION INTRAVENOUS at 08:54

## 2020-04-20 ENCOUNTER — HOSPITAL ENCOUNTER (OUTPATIENT)
Dept: LAB | Age: 45
Discharge: HOME | End: 2020-04-20

## 2020-04-20 PROCEDURE — 87635 SARS-COV-2 COVID-19 AMP PRB: CPT

## 2020-04-22 LAB — COVID-19, XGCOVT: NEGATIVE

## 2020-08-28 ENCOUNTER — HOSPITAL ENCOUNTER (EMERGENCY)
Age: 45
Discharge: HOME | End: 2020-08-28
Attending: EMERGENCY MEDICINE
Payer: MEDICAID

## 2020-08-28 VITALS
OXYGEN SATURATION: 98 % | DIASTOLIC BLOOD PRESSURE: 82 MMHG | SYSTOLIC BLOOD PRESSURE: 130 MMHG | TEMPERATURE: 99.4 F | HEART RATE: 119 BPM | RESPIRATION RATE: 17 BRPM

## 2020-08-28 DIAGNOSIS — F41.8 ANXIETY ASSOCIATED WITH DEPRESSION: Primary | ICD-10-CM

## 2020-08-28 LAB
AMPHET UR QL SCN: NEGATIVE
APPEARANCE UR: CLEAR
BARBITURATES UR QL SCN: NEGATIVE
BENZODIAZ UR QL: NEGATIVE
BILIRUB UR QL: NEGATIVE
CANNABINOIDS UR QL SCN: POSITIVE
COCAINE UR QL SCN: NEGATIVE
COLOR UR: YELLOW
GLUCOSE UR STRIP.AUTO-MCNC: NEGATIVE MG/DL
HDSCOM,HDSCOM: ABNORMAL
HGB UR QL STRIP: NEGATIVE
KETONES UR QL STRIP.AUTO: NEGATIVE MG/DL
LEUKOCYTE ESTERASE UR QL STRIP.AUTO: NEGATIVE
METHADONE UR QL: NEGATIVE
NITRITE UR QL STRIP.AUTO: NEGATIVE
OPIATES UR QL: NEGATIVE
PCP UR QL: NEGATIVE
PH UR STRIP: 5 [PH] (ref 5–8)
PROT UR STRIP-MCNC: NEGATIVE MG/DL
SP GR UR REFRACTOMETRY: 1.01 (ref 1–1.03)
UROBILINOGEN UR QL STRIP.AUTO: 0.2 EU/DL (ref 0.2–1)

## 2020-08-28 PROCEDURE — 99282 EMERGENCY DEPT VISIT SF MDM: CPT

## 2020-08-28 PROCEDURE — 81003 URINALYSIS AUTO W/O SCOPE: CPT

## 2020-08-28 PROCEDURE — 80307 DRUG TEST PRSMV CHEM ANLYZR: CPT

## 2020-08-28 RX ORDER — ALPRAZOLAM 0.5 MG/1
0.5 TABLET ORAL
Qty: 20 TAB | Refills: 0 | Status: SHIPPED | OUTPATIENT
Start: 2020-08-28 | End: 2021-06-20 | Stop reason: SDUPTHER

## 2020-08-28 NOTE — DISCHARGE INSTRUCTIONS
Patient Education        Learning About Generalized Anxiety Disorder  What is generalized anxiety disorder? We all worry. It's a normal part of life. But when you have generalized anxiety disorder, you worry about lots of things and have a hard time stopping your worry. This worry or anxiety interferes with your relationships, work, and life. What causes it? The cause is not known. But it may be passed down through families. What are the symptoms? You may feel anxious or worry most days about things like work, relationships, health, or money. You may worry about things that are unlikely to happen. You find it hard to stop or control the worry. Because you worry a lot and try hard to stop worrying, you may feel restless, tired, tense, or cranky. You may also find it hard to think or sleep. And you may have headaches or an upset stomach. How is it diagnosed? Your doctor will ask about your health and how often you worry or feel anxious. He or she may ask about other symptoms, like whether you:  · Feel restless. · Feel tired. · Have a hard time thinking or feel that your mind goes blank. · Feel cranky. · Have tense muscles. · Have sleep problems. A physical exam and tests can help make sure that your symptoms aren't caused by a different condition, such as a thyroid problem. How is it treated? Counseling and medicine can both work to treat anxiety. The two are often used along with lifestyle changes. Cognitive-behavioral therapy (CBT) is a type of counseling that's used to help treat anxiety. In CBT, you learn how to notice and replace thoughts that make you feel worried. It also can help you learn how to relax when you worry. Medicines can help. These medicines are often also used for depression. Selective serotonin reuptake inhibitors (SSRIs) are often tried first. But there are other medicines that your doctor may use. You may need to try a few medicines to find one that works well.   Many people feel better by getting regular exercise, eating healthy meals, and getting good sleep. Mindfulness--focusing on things that happen in the present moment--also can help reduce your anxiety. What can you expect when you have it? Having anxiety can be upsetting. Some people might feel less worried and stressed after a couple of months of treatment. But for other people, it might take longer to feel better. Reaching out to people for help is important. Try not to isolate yourself. Let your family and friends help you. Find someone you can trust and confide in. Talk to that person. When you know what anxiety is--and how you can get help for it--you can start to learn new ways of thinking. This can help you cope and work through your anxiety. Follow-up care is a key part of your treatment and safety. Be sure to make and go to all appointments, and call your doctor if you are having problems. It's also a good idea to know your test results and keep a list of the medicines you take. Where can you learn more? Go to http://www.dao.com/  Enter G110 in the search box to learn more about \"Learning About Generalized Anxiety Disorder. \"  Current as of: January 31, 2020               Content Version: 12.5  © 1763-5467 Healthwise, Incorporated. Care instructions adapted under license by [employer] (which disclaims liability or warranty for this information). If you have questions about a medical condition or this instruction, always ask your healthcare professional. [address] any warranty or liability for your use of this information.

## 2020-08-28 NOTE — ED TRIAGE NOTES
Pt with c/o panic attacks which is chronic but worse over the past 3 weeks. Pt states that she is here in the ED to prevent suicide. No HI. No drug use.
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
## 2020-10-26 ENCOUNTER — HOSPITAL ENCOUNTER (OUTPATIENT)
Dept: ULTRASOUND IMAGING | Age: 45
Discharge: HOME | End: 2020-10-26
Attending: UROLOGY
Payer: MEDICAID

## 2020-10-26 DIAGNOSIS — R10.2 SUPRAPUBIC PAIN: ICD-10-CM

## 2020-10-26 DIAGNOSIS — R39.89 BLADDER PAIN: ICD-10-CM

## 2020-10-26 DIAGNOSIS — R10.84 GENERALIZED ABDOMINAL PAIN: ICD-10-CM

## 2020-10-26 DIAGNOSIS — D17.71 ANGIOMYOLIPOMA OF LEFT KIDNEY: ICD-10-CM

## 2020-10-26 PROCEDURE — 76700 US EXAM ABDOM COMPLETE: CPT

## 2020-10-26 PROCEDURE — 76857 US EXAM PELVIC LIMITED: CPT

## 2021-01-27 NOTE — PROGRESS NOTES
Letter sent to patient- needs to schedule appt for cysto per Dr. Theodore Hernández.     Levi Llamas

## 2021-05-28 ENCOUNTER — HOSPITAL ENCOUNTER (EMERGENCY)
Age: 46
Discharge: HOME | End: 2021-05-29
Payer: MEDICAID

## 2021-05-28 VITALS
HEART RATE: 118 BPM | OXYGEN SATURATION: 98 % | DIASTOLIC BLOOD PRESSURE: 88 MMHG | BODY MASS INDEX: 28.52 KG/M2 | TEMPERATURE: 99.3 F | RESPIRATION RATE: 16 BRPM | HEIGHT: 62 IN | WEIGHT: 155 LBS | SYSTOLIC BLOOD PRESSURE: 141 MMHG

## 2021-05-28 DIAGNOSIS — B85.2 LICE INFESTATION: Primary | ICD-10-CM

## 2021-05-28 PROCEDURE — 99281 EMR DPT VST MAYX REQ PHY/QHP: CPT

## 2021-05-28 RX ORDER — HYDROXYZINE 50 MG/1
50 TABLET, FILM COATED ORAL
Qty: 20 TABLET | Refills: 0 | Status: SHIPPED | OUTPATIENT
Start: 2021-05-28 | End: 2021-06-07

## 2021-05-28 RX ORDER — IVERMECTIN 3 MG/1
TABLET ORAL
Qty: 2 TABLET | Refills: 0 | Status: SHIPPED | OUTPATIENT
Start: 2021-05-28 | End: 2021-06-20 | Stop reason: SDUPTHER

## 2021-05-28 RX ORDER — IVERMECTIN 3 MG/1
TABLET ORAL
Qty: 2 TABLET | Refills: 0 | Status: SHIPPED | OUTPATIENT
Start: 2021-05-28 | End: 2021-05-28 | Stop reason: SDUPTHER

## 2021-05-28 NOTE — ED PROVIDER NOTES
Patient is a 30-year-old woman with past medical history significant for anxiety, who presents to the ED today for concern for lice exposure. 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 She sees little white dots in her hair. She thinks that she may have pubic lice as well. She is also seeing black objects on her skin. She has tried over-the-counter lice treatment. She states that she has used her 10th bottle. She is also using isopropyl alcohol and using bleach and water mixture. He states that despite all of the above measures, she has not been able to get rid of the lice.            Past Medical History:   Diagnosis Date    Anxiety     Pelvic inflammatory disease     Psychiatric disorder     Uterine leiomyoma        Past Surgical History:   Procedure Laterality Date    HX HYSTERECTOMY      HX OTHER SURGICAL  06/25/2019     Procedure: EXAM UNDER ANESTHESIA, PELVIC; ;INCISION AND DRAINAGE HEMATOMA VAGINAL; Surgeon: Gadiel Mcdonough MD    HX OTHER SURGICAL  10/2019    remove tumor from hip    HX TOTAL LAPAROSCOPIC HYSTERECTOMY  06/19/2019     Procedure: HYSTERECTOMY, TOTAL, LAPAROSCOPIC, WITH BILATERAL SALPINGECTOMY AND LEFT OOPHORECTOMY, MINI LAPAROTOMY, CYSTOSCOPY; Surgeon: Alvino Ahn MD         Family History:   Problem Relation Age of Onset    Heart Disease Other        Social History     Socioeconomic History    Marital status: SINGLE     Spouse name: Not on file    Number of children: Not on file    Years of education: Not on file    Highest education level: Not on file   Occupational History    Not on file   Tobacco Use    Smoking status: Current Every Day Smoker     Packs/day: 1.00    Smokeless tobacco: Never Used   Substance and Sexual Activity    Alcohol use: Not Currently    Drug use: Not on file    Sexual activity: Not on file   Other Topics Concern    Not on file   Social History Narrative    Not on file Social Determinants of Health     Financial Resource Strain:     Difficulty of Paying Living Expenses:    Food Insecurity:     Worried About Running Out of Food in the Last Year:     [address] in the Last Year:    Transportation Needs:     Lack of Transportation (Medical):  Lack of Transportation (Non-Medical):    Physical Activity:     Days of Exercise per Week:     Minutes of Exercise per Session:    Stress:     Feeling of Stress :    Social Connections:     Frequency of Communication with Friends and Family:     Frequency of Social Gatherings with Friends and Family:     Attends Shinto Services:     Active Member of Clubs or Organizations:     Attends Club or Organization Meetings:     Marital Status:    Intimate Partner Violence:     Fear of Current or Ex-Partner:     Emotionally Abused:     Physically Abused:     Sexually Abused: ALLERGIES: Patient has no known allergies. Review of Systems   All other systems reviewed and are negative. Vitals:    05/28/21 0653   BP: (!) 141/88   Pulse: (!) 118   Resp: 16   Temp: 99.3 °F (37.4 °C)   SpO2: 98%   Weight: 70.3 kg (155 lb)   Height: 5' 2\" (1.575 m)            Physical Exam  Vitals and nursing note reviewed. HENT:      Head: Normocephalic and atraumatic. Nose: Nose normal.      Mouth/Throat:      Mouth: Mucous membranes are moist.      Pharynx: Oropharynx is clear. Eyes:      Extraocular Movements: Extraocular movements intact. Conjunctiva/sclera: Conjunctivae normal.      Pupils: Pupils are equal, round, and reactive to light. Cardiovascular:      Rate and Rhythm: Regular rhythm. Tachycardia present. Pulses: Normal pulses. Heart sounds: Normal heart sounds. Pulmonary:      Effort: Pulmonary effort is normal.      Breath sounds: Normal breath sounds. Abdominal:      General: Abdomen is flat. Bowel sounds are normal.      Palpations: Abdomen is soft.    Musculoskeletal:         General: Normal range of motion. Cervical back: Normal range of motion and neck supple. Skin:     General: Skin is warm and dry. Capillary Refill: Capillary refill takes 2 to 3 seconds. Comments: Excoriation throughout skin   Neurological:      Mental Status: She is alert and oriented to person, place, and time. 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 She has tried over-the-counter treatments as well as topical alcohol and bleach. Her symptoms have not abated despite all of these attempted treatments. I have prescribed the patient ivermectin to take today as well as in 10 days from now. I have also prescribed Atarax. Additionally I gave the patient the information for a local lice clinic called [facility],  For further eval and mgmt.            Procedures

## 2021-05-28 NOTE — ED TRIAGE NOTES
Patient presents to the ED with complaints of a lice infestation. 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 Patient states she has rashes on her back and legs.

## 2021-06-20 ENCOUNTER — HOSPITAL ENCOUNTER (EMERGENCY)
Age: 46
Discharge: HOME | End: 2021-06-20
Attending: STUDENT IN AN ORGANIZED HEALTH CARE EDUCATION/TRAINING PROGRAM
Payer: MEDICAID

## 2021-06-20 VITALS
OXYGEN SATURATION: 99 % | SYSTOLIC BLOOD PRESSURE: 125 MMHG | RESPIRATION RATE: 18 BRPM | TEMPERATURE: 98.6 F | DIASTOLIC BLOOD PRESSURE: 95 MMHG | HEART RATE: 118 BPM

## 2021-06-20 DIAGNOSIS — F41.8 ANXIETY ASSOCIATED WITH DEPRESSION: ICD-10-CM

## 2021-06-20 DIAGNOSIS — R23.8 SKIN IRRITATION: ICD-10-CM

## 2021-06-20 DIAGNOSIS — F41.9 ANXIETY: Primary | ICD-10-CM

## 2021-06-20 PROCEDURE — 99281 EMR DPT VST MAYX REQ PHY/QHP: CPT

## 2021-06-20 RX ORDER — ALPRAZOLAM 0.5 MG/1
0.5 TABLET ORAL
Qty: 20 TABLET | Refills: 0 | Status: SHIPPED | OUTPATIENT
Start: 2021-06-20

## 2021-06-20 RX ORDER — GUAIFENESIN/DM/PSEUDOEPHEDRINE 67-10
LIQUID (ML) ORAL
Qty: 1 BOTTLE | Refills: 1 | Status: SHIPPED | OUTPATIENT
Start: 2021-06-20

## 2021-06-20 RX ORDER — IVERMECTIN 3 MG/1
TABLET ORAL
Qty: 2 TABLET | Refills: 0 | Status: SHIPPED | OUTPATIENT
Start: 2021-06-20 | End: 2022-04-13

## 2021-06-20 NOTE — ED PROVIDER NOTES
EMERGENCY DEPARTMENT HISTORY AND PHYSICAL EXAM    Date: 6/20/2021  Patient Name: Cyndy Cramer    History of Presenting Illness     Chief Complaint   Patient presents with    Rash         History Provided By: Patient    Additional History (Context): Cyndy Cramer is a 40 y.o. female with anxiety who presents with concern for lice infestation. This is 1 of many visits since Mother's Day when patient thinks that she is had an infestation of lice. She lives with her son and they do share the same bathroom. She said he is not committed to seeing the bugs and does not have an infestation problem. His daughter also lives with them in the 3year-old daughter is not affected. Patient is now shaved off of her hair. She is using alcohol, bleach, Windex to clean her body. Spoke with son via telephone who confirms the above. He does not confirm that she has a psychiatric history other than anxiety. PCP: Carmencita Rajput MD    Current Outpatient Medications   Medication Sig Dispense Refill    ivermectin (STROMECTOL) 3 mg tablet Take 1 tab today and then again in 10 days 2 Tablet 0    ALPRAZolam (Xanax) 0.5 mg tablet Take 1 Tablet by mouth every eight (8) hours as needed for Anxiety. Max Daily Amount: 1.5 mg. 20 Tablet 0    magnesium oxide (MAG-OX) 400 mg tablet Take 400 mg by mouth daily.  docusate sodium (COLACE) 100 mg capsule Take 100 mg by mouth daily.  B.infantis-B.ani-B.long-B.bifi (Probiotic 4X) 10-15 mg TbEC Take  by mouth.  PARoxetine (PaxiL) 30 mg tablet Take 30 mg by mouth daily.  ibuprofen (MOTRIN) 800 mg tablet Take  by mouth.  magnesium hydroxide (MILK OF MAGNESIA) 400 mg/5 mL suspension Take 30 mL by mouth daily as needed for Constipation.          Past History     Past Medical History:  Past Medical History:   Diagnosis Date    Anxiety     Pelvic inflammatory disease     Psychiatric disorder     Uterine leiomyoma        Past Surgical History:  Past Surgical History:   Procedure Laterality Date    HX HYSTERECTOMY      HX OTHER SURGICAL  06/25/2019     Procedure: EXAM UNDER ANESTHESIA, PELVIC; ;INCISION AND DRAINAGE HEMATOMA VAGINAL; Surgeon: Annabella Wells MD    HX OTHER SURGICAL  10/2019    remove tumor from hip    HX TOTAL LAPAROSCOPIC HYSTERECTOMY  06/19/2019     Procedure: HYSTERECTOMY, TOTAL, LAPAROSCOPIC, WITH BILATERAL SALPINGECTOMY AND LEFT OOPHORECTOMY, MINI LAPAROTOMY, CYSTOSCOPY; Surgeon: Jimenez Mendosa MD       Family History:  Family History   Problem Relation Age of Onset    Heart Disease Other        Social History:  Social History     Tobacco Use    Smoking status: Current Every Day Smoker     Packs/day: 1.00    Smokeless tobacco: Never Used   Substance Use Topics    Alcohol use: Not Currently    Drug use: Not on file       Allergies:  No Known Allergies      Review of Systems   Review of Systems   Skin: Positive for rash. Psychiatric/Behavioral: The patient is nervous/anxious. All Other Systems Negative  Physical Exam     Vitals:    06/20/21 1218   BP: (!) 125/95   Pulse: (!) 118   Resp: 18   Temp: 98.6 °F (37 °C)   SpO2: 99%     Physical Exam  Vitals and nursing note reviewed. Constitutional:       Appearance: She is well-developed. HENT:      Head: Normocephalic and atraumatic. Eyes:      Pupils: Pupils are equal, round, and reactive to light. Neck:      Thyroid: No thyromegaly. Vascular: No JVD. Trachea: No tracheal deviation. Cardiovascular:      Rate and Rhythm: Normal rate and regular rhythm. Heart sounds: Normal heart sounds. No murmur heard. No friction rub. No gallop. Pulmonary:      Effort: Pulmonary effort is normal. No respiratory distress. Breath sounds: Normal breath sounds. No stridor. No wheezing or rales. Chest:      Chest wall: No tenderness. Abdominal:      General: There is no distension. Palpations: Abdomen is soft. There is no mass. Tenderness: There is no abdominal tenderness. There is no guarding or rebound. Musculoskeletal:         General: No tenderness. Lymphadenopathy:      Cervical: No cervical adenopathy. Skin:     General: Skin is warm and dry. Coloration: Skin is not pale. Findings: Rash present. No erythema. Comments: No nits in hair eyebrows or eyelashes. Mild erythematous confluen patches on her upper and lower back. No visible sores. Excoriations. Neurological:      Mental Status: She is alert and oriented to person, place, and time. Psychiatric:         Behavior: Behavior normal.         Thought Content: Thought content normal.          Diagnostic Study Results     Labs -   No results found for this or any previous visit (from the past 12 hour(s)). Radiologic Studies -   No orders to display     CT Results  (Last 48 hours)    None        CXR Results  (Last 48 hours)    None            Medical Decision Making   I am the first provider for this patient. I reviewed the vital signs, available nursing notes, past medical history, past surgical history, family history and social history. Vital Signs-Reviewed the patient's vital signs. Records Reviewed: Nursing Notes and Old Medical Records    Procedures:  Procedures    Provider Notes (Medical Decision Making): Do not see any samples to send off to lab. We will have her follow-up with the health department. MED RECONCILIATION:  No current facility-administered medications for this encounter. Current Outpatient Medications   Medication Sig    ivermectin (STROMECTOL) 3 mg tablet Take 1 tab today and then again in 10 days    ALPRAZolam (Xanax) 0.5 mg tablet Take 1 Tablet by mouth every eight (8) hours as needed for Anxiety. Max Daily Amount: 1.5 mg.    magnesium oxide (MAG-OX) 400 mg tablet Take 400 mg by mouth daily.  docusate sodium (COLACE) 100 mg capsule Take 100 mg by mouth daily.     B.infantis-B.ani-B.long-B.bifi (Probiotic 4X) 10-15 mg TbEC Take  by mouth.  PARoxetine (PaxiL) 30 mg tablet Take 30 mg by mouth daily.  ibuprofen (MOTRIN) 800 mg tablet Take  by mouth.  magnesium hydroxide (MILK OF MAGNESIA) 400 mg/5 mL suspension Take 30 mL by mouth daily as needed for Constipation. Disposition:  home    DISCHARGE NOTE:   12:59 PM    Pt has been reexamined. Patient has no new complaints, changes, or physical findings. Care plan outlined and precautions discussed. Results of exam were reviewed with the patient. All medications were reviewed with the patient; will d/c home with see below. All of pt's questions and concerns were addressed. Patient was instructed and agrees to follow up with HD, PCP, as well as to return to the ED upon further deterioration. Patient is ready to go home. Follow-up Information     Follow up With Specialties Details Why [address] [facility] Department  Schedule an appointment as soon as possible for a visit in 1 day  [address]    Carmencita Rajput MD Family Medicine Schedule an appointment as soon as possible for a visit in 2 days  [address] [address] [address]  [phone]      [facility] EMERGENCY DEPT Emergency Medicine  If symptoms worsen return immediately [address] an appointment as soon as possible for a visit in 2 days  Terri [address] [place] [address]          Current Discharge Medication List      CONTINUE these medications which have CHANGED    Details   ivermectin (STROMECTOL) 3 mg tablet Take 1 tab today and then again in 10 days  Qty: 2 Tablet, Refills: 0  Start date: 6/20/2021      ALPRAZolam (Xanax) 0.5 mg tablet Take 1 Tablet by mouth every eight (8) hours as needed for Anxiety.  Max Daily Amount: 1.5 mg.  Qty: 20 Tablet, Refills: 0  Start date: 6/20/2021    Associated Diagnoses: Anxiety associated with depression               Diagnosis     Clinical Impression:   1. Anxiety    2. Skin irritation    3.  Anxiety associated with depression

## 2021-06-20 NOTE — ED TRIAGE NOTES
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 Pt  States she is been treated for several times and has been using windex and bleach to get the bugs off of her. Pt states she cut all of her hair off today and used hair removal for the rest of the hair and saw bugs in her head. Pt also states she has a lump under her right arm and states she sprayed alcohol and felt the bugs crawl up. Pt also presents to triage with a sample of her sputum and states there is white eggs in it.

## 2021-07-20 ENCOUNTER — TRANSCRIBE ORDER (OUTPATIENT)
Dept: REGISTRATION | Age: 46
End: 2021-07-20

## 2021-07-20 ENCOUNTER — HOSPITAL ENCOUNTER (OUTPATIENT)
Dept: GENERAL RADIOLOGY | Age: 46
Discharge: HOME | End: 2021-07-20
Payer: MEDICAID

## 2021-07-20 DIAGNOSIS — B85.1: ICD-10-CM

## 2021-07-20 DIAGNOSIS — R09.3 ABNORMAL SPUTUM: Primary | ICD-10-CM

## 2021-07-20 DIAGNOSIS — R09.3 ABNORMAL SPUTUM: ICD-10-CM

## 2021-07-20 PROCEDURE — 71046 X-RAY EXAM CHEST 2 VIEWS: CPT

## 2021-09-20 ENCOUNTER — HOSPITAL ENCOUNTER (OUTPATIENT)
Dept: ULTRASOUND IMAGING | Age: 46
Discharge: HOME | End: 2021-09-20
Attending: PHYSICIAN ASSISTANT
Payer: MEDICAID

## 2021-09-20 DIAGNOSIS — D17.71 ANGIOMYOLIPOMA OF RIGHT KIDNEY: ICD-10-CM

## 2021-09-20 DIAGNOSIS — R10.9 ABDOMINAL PAIN, UNSPECIFIED ABDOMINAL LOCATION: ICD-10-CM

## 2021-09-20 PROCEDURE — 76770 US EXAM ABDO BACK WALL COMP: CPT

## 2021-09-23 NOTE — PROGRESS NOTES
Reviewed. Renal US looks good Lesion noted on right kidney are stable in size and favor benign etiology. Follow up as scheduled to review.     [place], [place]

## 2022-04-13 ENCOUNTER — HOSPITAL ENCOUNTER (EMERGENCY)
Age: 47
Discharge: LEFT BEFORE DISCHARGE | End: 2022-04-14
Attending: STUDENT IN AN ORGANIZED HEALTH CARE EDUCATION/TRAINING PROGRAM
Payer: MEDICAID

## 2022-04-13 VITALS
HEART RATE: 58 BPM | DIASTOLIC BLOOD PRESSURE: 88 MMHG | SYSTOLIC BLOOD PRESSURE: 125 MMHG | RESPIRATION RATE: 18 BRPM | OXYGEN SATURATION: 96 % | BODY MASS INDEX: 27.44 KG/M2 | WEIGHT: 150 LBS | TEMPERATURE: 98.4 F

## 2022-04-13 DIAGNOSIS — Z53.21 ELOPED FROM EMERGENCY DEPARTMENT: ICD-10-CM

## 2022-04-13 DIAGNOSIS — F22 EKBOM'S DELUSIONAL PARASITOSIS (HCC): Primary | ICD-10-CM

## 2022-04-13 DIAGNOSIS — B37.31 VAGINAL YEAST INFECTION: ICD-10-CM

## 2022-04-13 PROCEDURE — 81025 URINE PREGNANCY TEST: CPT

## 2022-04-13 PROCEDURE — 87210 SMEAR WET MOUNT SALINE/INK: CPT

## 2022-04-13 PROCEDURE — 87491 CHLMYD TRACH DNA AMP PROBE: CPT

## 2022-04-13 PROCEDURE — 81001 URINALYSIS AUTO W/SCOPE: CPT

## 2022-04-13 PROCEDURE — 80307 DRUG TEST PRSMV CHEM ANLYZR: CPT

## 2022-04-13 PROCEDURE — 99283 EMERGENCY DEPT VISIT LOW MDM: CPT

## 2022-04-13 RX ORDER — ONDANSETRON 2 MG/ML
4 INJECTION INTRAMUSCULAR; INTRAVENOUS
Status: DISCONTINUED | OUTPATIENT
Start: 2022-04-13 | End: 2022-04-14

## 2022-04-13 NOTE — Clinical Note
[address] [place] [address] Dr [facility] EMERGENCY DEPT  [address] [address] [phone]    Work/School Note    Date: 4/13/2022    To Whom It May concern:    Jason Arcos was seen and treated today in the emergency room by the following provider(s):  Attending Provider: Tye Barbosa DO  Physician Assistant: Feli Vences. Jason Arcos is excused from work/school on 04/14/22 and 04/15/22. She is medically clear to return to work/school on 4/16/2022.        Sincerely,          Miriam Akers RN

## 2022-04-13 NOTE — Clinical Note
[address] [place] [address] Dr [facility] EMERGENCY DEPT  [phone] [address] [phone]    Work/School Note    Date: 4/13/2022    To Whom It May concern:    Ethan Izquierdo was seen and treated today in the emergency room by the following provider(s):  Attending Provider: Tam Lou DO  Physician Assistant: Feli Ta. Ethan Izquierdo is excused from work/school on 04/14/22 and 04/15/22. She is medically clear to return to work/school on 4/16/2022.        Sincerely,          Radha Glover PA-C

## 2022-04-14 LAB
AMPHET UR QL SCN: NEGATIVE
APPEARANCE UR: CLEAR
BACTERIA URNS QL MICRO: ABNORMAL /HPF
BARBITURATES UR QL SCN: NEGATIVE
BENZODIAZ UR QL: POSITIVE
BILIRUB UR QL: NEGATIVE
C TRACH RRNA SPEC QL NAA+PROBE: NEGATIVE
CANNABINOIDS UR QL SCN: POSITIVE
COCAINE UR QL SCN: NEGATIVE
COLOR UR: YELLOW
EPITH CASTS URNS QL MICRO: ABNORMAL /LPF (ref 0–5)
GLUCOSE UR STRIP.AUTO-MCNC: NEGATIVE MG/DL
HCG UR QL: NEGATIVE
HDSCOM,HDSCOM: ABNORMAL
HGB UR QL STRIP: ABNORMAL
KETONES UR QL STRIP.AUTO: NEGATIVE MG/DL
LEUKOCYTE ESTERASE UR QL STRIP.AUTO: ABNORMAL
METHADONE UR QL: NEGATIVE
N GONORRHOEA RRNA SPEC QL NAA+PROBE: NEGATIVE
NITRITE UR QL STRIP.AUTO: NEGATIVE
OPIATES UR QL: NEGATIVE
PCP UR QL: NEGATIVE
PH UR STRIP: 5.5 [PH] (ref 5–8)
PROT UR STRIP-MCNC: NEGATIVE MG/DL
RBC #/AREA URNS HPF: ABNORMAL /HPF (ref 0–5)
SERVICE CMNT-IMP: NORMAL
SP GR UR REFRACTOMETRY: 1.01 (ref 1–1.03)
SPECIMEN SOURCE: NORMAL
UROBILINOGEN UR QL STRIP.AUTO: 0.2 EU/DL (ref 0.2–1)
WBC URNS QL MICRO: ABNORMAL /HPF (ref 0–4)
WET PREP GENITAL: NORMAL
YEAST URNS QL MICRO: ABNORMAL

## 2022-04-14 RX ORDER — FLUCONAZOLE 100 MG/1
150 TABLET ORAL
Status: DISCONTINUED | OUTPATIENT
Start: 2022-04-14 | End: 2022-04-14 | Stop reason: HOSPADM

## 2022-04-14 RX ORDER — ACETAMINOPHEN 500 MG
1000 TABLET ORAL
Status: DISCONTINUED | OUTPATIENT
Start: 2022-04-14 | End: 2022-04-14 | Stop reason: HOSPADM

## 2022-04-14 NOTE — ED PROVIDER NOTES
EMERGENCY DEPARTMENT HISTORY AND PHYSICAL EXAM    Date: 4/13/2022  Patient Name: Yvon Lacy    History of Presenting Illness     Chief Complaint   Patient presents with    Skin Problem         History Provided By:patient     Chief Complaint: itching, bug infestation   Duration: months   Timing:  Chronic   Location: scalp, arms, torso   Quality:itchy   Severity: moderate to severe  Modifying Factors: permethrin and ivermectin have not helped  Associated Symptoms: itching, rash, vaginal discharge and itching       Additional History (Context): Yvon Lacy is a 56 y.o. female with PMH PTSD, anxiety, depression, and PID who presents with c/o persistent rash and itching to the scalp arms and torso for the past several months. 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 She states she has completed several rounds of permethrin topical cream as well as oral ivermectin. Patient states that she has been seen multiple times for this issue and has been told it might just be anxiety. She recently shaved her head and she felt that the lice were infecting her scalp and she could not get rid of them. She also reports a few days of vaginal itching and discharge. Denies known STD exposure. No other complaints reported at this time. PCP: Natalie Sethi MD    Current Facility-Administered Medications   Medication Dose Route Frequency Provider Last Rate Last Admin    fluconazole (DIFLUCAN) tablet 150 mg  150 mg Oral NOW Radha Glover PA-C        acetaminophen (TYLENOL) tablet 1,000 mg  1,000 mg Oral NOW Radha Glover, [facility]         Current Outpatient Medications   Medication Sig Dispense Refill    sertraline (ZOLOFT) 25 mg tablet Take 25 mg by mouth daily.  nicotine (NICODERM CQ) 21 mg/24 hr APPLY 1 PATCH TOPICALLY ONCE DAILY AS DIRECTED      cetirizine (ZYRTEC) 10 mg tablet Take 10 mg by mouth nightly.         ALPRAZolam (Xanax) 0.5 mg tablet Take 1 Tablet by mouth every eight (8) hours as needed for Anxiety. Max Daily Amount: 1.5 mg. 20 Tablet 0    permethrin (Lice Treatment, permethrin,) 1 % lotion follow package directions; apply and keep on for 8hrs until rinsing. 1 Bottle 1    magnesium oxide (MAG-OX) 400 mg tablet Take 400 mg by mouth daily.  B.infantis-B.ani-B.long-B.bifi (Probiotic 4X) 10-15 mg TbEC Take  by mouth.  ibuprofen (MOTRIN) 800 mg tablet Take  by mouth.  magnesium hydroxide (MILK OF MAGNESIA) 400 mg/5 mL suspension Take 30 mL by mouth daily as needed for Constipation. Past History     Past Medical History:  Past Medical History:   Diagnosis Date    Anxiety     History of left nephrectomy     Pelvic inflammatory disease     Psychiatric disorder     PTSD (post-traumatic stress disorder)     Uterine leiomyoma        Past Surgical History:  Past Surgical History:   Procedure Laterality Date    HX HYSTERECTOMY      HX OTHER SURGICAL  06/25/2019     Procedure: EXAM UNDER ANESTHESIA, PELVIC; ;INCISION AND DRAINAGE HEMATOMA VAGINAL; Surgeon: Kathy Sanchez MD    HX OTHER SURGICAL  10/2019    remove tumor from hip    HX TOTAL LAPAROSCOPIC HYSTERECTOMY  06/19/2019     Procedure: HYSTERECTOMY, TOTAL, LAPAROSCOPIC, WITH BILATERAL SALPINGECTOMY AND LEFT OOPHORECTOMY, MINI LAPAROTOMY, CYSTOSCOPY; Surgeon: Kasey Rome MD       Family History:  Family History   Problem Relation Age of Onset    Heart Disease Other        Social History:  Social History     Tobacco Use    Smoking status: Current Every Day Smoker     Packs/day: 1.00    Smokeless tobacco: Never Used   Substance Use Topics    Alcohol use: Not Currently    Drug use: Not on file       Allergies:  No Known Allergies      Review of Systems   Review of Systems   Constitutional: Negative. Negative for chills and fever. HENT: Negative. Negative for congestion, ear pain and rhinorrhea. Eyes: Negative.   Negative for pain and redness. Respiratory: Negative. Negative for cough, shortness of breath, wheezing and stridor. Cardiovascular: Negative. Negative for chest pain and leg swelling. Gastrointestinal: Positive for nausea and vomiting. Negative for abdominal pain, constipation and diarrhea. Genitourinary: Positive for vaginal discharge. Negative for dysuria and frequency. Musculoskeletal: Negative. Negative for back pain and neck pain. Skin: Positive for rash. Itching    Neurological: Positive for dizziness and light-headedness. Negative for seizures, syncope and headaches. All other systems reviewed and are negative. All Other Systems Negative  Physical Exam     Vitals:    04/13/22 2250   BP: 125/88   Pulse: (!) 58   Resp: 18   Temp: 98.4 °F (36.9 °C)   SpO2: 96%   Weight: 68 kg (150 lb)     Physical Exam  Vitals and nursing note reviewed. Constitutional:       General: She is not in acute distress. Appearance: She is well-developed. She is not diaphoretic. HENT:      Head: Normocephalic and atraumatic. Eyes:      General: No scleral icterus. Right eye: No discharge. Left eye: No discharge. Conjunctiva/sclera: Conjunctivae normal.   Cardiovascular:      Rate and Rhythm: Normal rate and regular rhythm. Heart sounds: Normal heart sounds. No murmur heard. No friction rub. No gallop. Pulmonary:      Effort: Pulmonary effort is normal. No respiratory distress. Breath sounds: Normal breath sounds. No stridor. No wheezing, rhonchi or rales. Genitourinary:     Comments: Completed self swabs at bedside   Musculoskeletal:         General: Normal range of motion. Cervical back: Normal range of motion and neck supple. Skin:     General: Skin is warm and dry. Comments: Pt have shaved her scalp, scaling dermatitis noted to the posterior scalp region. Few scattered hyperpigmented areas of healing scabs noted to the arms and upper back.     Neurological:      General: No focal deficit present. Mental Status: She is alert and oriented to person, place, and time. Mental status is at baseline. Coordination: Coordination normal.      Comments: Gait is steady and patient exhibits no evidence of ataxia. Patient is able to ambulate without difficulty. No focal neurological deficit noted. No facial droop, slurred speech, or evidence of altered mentation noted on exam.     Psychiatric:         Mood and Affect: Mood normal.         Behavior: Behavior normal.         Thought Content:  Thought content normal.                Diagnostic Study Results     Labs -     Recent Results (from the past 12 hour(s))   URINALYSIS W/ RFLX MICROSCOPIC    Collection Time: 04/13/22 11:48 PM   Result Value Ref Range    Color YELLOW      Appearance CLEAR      Specific gravity 1.015 1.005 - 1.030      pH (UA) 5.5 5.0 - 8.0      Protein Negative NEG mg/dL    Glucose Negative NEG mg/dL    Ketone Negative NEG mg/dL    Bilirubin Negative NEG      Blood SMALL (A) NEG      Urobilinogen 0.2 0.2 - 1.0 EU/dL    Nitrites Negative NEG      Leukocyte Esterase MODERATE (A) NEG     DRUG SCREEN, URINE    Collection Time: 04/13/22 11:48 PM   Result Value Ref Range    BENZODIAZEPINES Positive (A) NEG      BARBITURATES Negative NEG      THC (TH-CANNABINOL) Positive (A) NEG      OPIATES Negative NEG      PCP(PHENCYCLIDINE) Negative NEG      COCAINE Negative NEG      AMPHETAMINES Negative NEG      METHADONE Negative NEG      HDSCOM (NOTE)    WET PREP    Collection Time: 04/13/22 11:48 PM    Specimen: Vagina   Result Value Ref Range    Special Requests: NO SPECIAL REQUESTS      Wet prep FEW  YEAST        Wet prep NO TRICHOMONAS SEEN      Wet prep CLUE CELLS ABSENT     HCG URINE, QL    Collection Time: 04/13/22 11:48 PM   Result Value Ref Range    HCG urine, QL Negative NEG     URINE MICROSCOPIC ONLY    Collection Time: 04/13/22 11:48 PM   Result Value Ref Range    WBC 4 to 6 0 - 4 /hpf    RBC 2 to 4 0 - 5 /hpf Epithelial cells 2+ 0 - 5 /lpf    Bacteria FEW (A) NEG /hpf    Yeast 0 to 2 NEG       Radiologic Studies -   No orders to display     CT Results  (Last 48 hours)    None        CXR Results  (Last 48 hours)    None            Medical Decision Making   I am the first provider for this patient. I reviewed the vital signs, available nursing notes, past medical history, past surgical history, family history and social history. Vital Signs-Reviewed the patient's vital signs. Records Reviewed: Radha Glover PA-C     Procedures:  Procedures    Provider Notes (Medical Decision Making): Impression:  Itching, rash, N/V    UA moderate leuk esterase, 0-2 yeast, wet prep shows yeast as well. hcg negative. UDS positive for xanax and THC. Labs ordered but not yet drawn. Pt reported to the triage nurse and asked to have her medications sent to her pharmacy. She eloped from the emergency department. 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 She has had a multiple visits requesting topical treatments as well as oral medications for lice and scabies infestations. The patient has no evidence of lice or scabies infestation on exam.  She has few scattered hyperpigmented areas of healing to her back and arms. The patient has shaved her scalp due to this issue and appears to have some scaling and dermatitis to the posterior scalp region. Her only prior mental health history includes PTSD anxiety and depression. She does take an antidepressant medication as well as benzos for anxiety. I do not feel that the patient was of any harm to herself or anyone else. No indication for petitioning for TDO at this time. Radha Glover PA-C         MED RECONCILIATION:  Current Facility-Administered Medications   Medication Dose Route Frequency    fluconazole (DIFLUCAN) tablet 150 mg  150 mg Oral NOW    acetaminophen (TYLENOL) tablet 1,000 mg  1,000 mg Oral NOW     Current Outpatient Medications Medication Sig    sertraline (ZOLOFT) 25 mg tablet Take 25 mg by mouth daily.  nicotine (NICODERM CQ) 21 mg/24 hr APPLY 1 PATCH TOPICALLY ONCE DAILY AS DIRECTED    cetirizine (ZYRTEC) 10 mg tablet Take 10 mg by mouth nightly.  ALPRAZolam (Xanax) 0.5 mg tablet Take 1 Tablet by mouth every eight (8) hours as needed for Anxiety. Max Daily Amount: 1.5 mg.  permethrin (Lice Treatment, permethrin,) 1 % lotion follow package directions; apply and keep on for 8hrs until rinsing.  magnesium oxide (MAG-OX) 400 mg tablet Take 400 mg by mouth daily.  B.infantis-B.ani-B.long-B.bifi (Probiotic 4X) 10-15 mg TbEC Take  by mouth.  ibuprofen (MOTRIN) 800 mg tablet Take  by mouth.  magnesium hydroxide (MILK OF MAGNESIA) 400 mg/5 mL suspension Take 30 mL by mouth daily as needed for Constipation. Disposition:  eloped        Diagnosis     Clinical Impression:   1. Ekbom's delusional parasitosis ([facility] [address]    2.  Vaginal yeast infection

## 2022-04-14 NOTE — DISCHARGE INSTRUCTIONS
[employer] Activation    Thank you for requesting access to [employer]. Please follow the instructions below to securely access and download your online medical record. [employer] allows you to send messages to your doctor, view your test results, renew your prescriptions, schedule appointments, and more. How Do I Sign Up? In your internet browser, go to [email or web address]  Click on the First Time User? Click Here link in the Sign In box. You will be redirect to the New Member Sign Up page. Enter your [employer] Access Code exactly as it appears below. You will not need to use this code after youve completed the sign-up process. If you do not sign up before the expiration date, you must request a new code. [employer] Access Code: Activation code not generated  Current [employer] Status: Active (This is the date your [employer] access code will )    Enter the last four digits of your Social Security Number (xxxx) and Date of Birth (mm/dd/yyyy) as indicated and click Submit. You will be taken to the next sign-up page. Create a [employer] ID. This will be your [employer] login ID and cannot be changed, so think of one that is secure and easy to remember. Create a [employer] password. You can change your password at any time. Enter your Password Reset Question and Answer. This can be used at a later time if you forget your password. Enter your e-mail address. You will receive e-mail notification when new information is available in [address] Click Sign Up. You can now view and download portions of your medical record. Click the [place] link to download a portable copy of your medical information. Additional Information    If you have questions, please visit the Frequently Asked Questions section of the [employer] website at [email or web address] Remember, [employer] is NOT to be used for urgent needs. For medical emergencies, dial 911.

## 2022-04-14 NOTE — ED TRIAGE NOTES
Called pt at home. States she just really needed to lay down. Pt states I just dont feel good. I will come back tomorrow. Explained to pt that if she felt that bad she needed to be seen tonite. Pt stated they told me it is a long wait. I explained to pt I would see her immediately iuf she comes back in.   Pt states she will come back tonite

## 2022-04-14 NOTE — ED TRIAGE NOTES
Patient states she has been treated since May for lice. States since she is breaking out in blisters,  Pt states she is now having headaches, dizzy, weak.

## 2022-11-18 ENCOUNTER — HOSPITAL ENCOUNTER (OUTPATIENT)
Dept: LAB | Age: 47
Discharge: HOME | End: 2022-11-18

## 2022-11-18 LAB — XX-LABCORP SPECIMEN COL,LCBCF: NORMAL

## 2022-11-18 PROCEDURE — 99001 SPECIMEN HANDLING PT-LAB: CPT

## 2023-04-20 ENCOUNTER — HOSPITAL ENCOUNTER (OUTPATIENT)
Facility: HOSPITAL | Age: 48
Discharge: HOME | End: 2023-04-23

## 2023-04-20 LAB — LABCORP SPECIMEN COLLECTION: NORMAL

## 2023-04-20 PROCEDURE — 99001 SPECIMEN HANDLING PT-LAB: CPT

## 2023-06-12 PROBLEM — D50.0 ANEMIA DUE TO CHRONIC BLOOD LOSS: Status: ACTIVE | Noted: 2019-06-19

## 2023-06-12 PROBLEM — Z90.710 S/P HYSTERECTOMY: Status: ACTIVE | Noted: 2019-06-19

## 2023-06-12 PROBLEM — N76.0 VAGINAL CUFF CELLULITIS: Status: ACTIVE | Noted: 2019-06-27

## 2023-06-12 PROBLEM — T81.9XXA POST-OPERATIVE COMPLICATION: Status: ACTIVE | Noted: 2019-06-22

## 2023-06-12 PROBLEM — N28.89 RENAL MASS: Status: ACTIVE | Noted: 2019-12-30

## 2023-07-24 ENCOUNTER — HOSPITAL ENCOUNTER (OUTPATIENT)
Facility: HOSPITAL | Age: 48
Discharge: HOME | End: 2023-07-27
Attending: UROLOGY
Payer: MEDICAID

## 2023-07-24 DIAGNOSIS — D17.71 ANGIOMYOLIPOMA OF RIGHT KIDNEY: ICD-10-CM

## 2023-07-24 LAB — CREAT UR-MCNC: 1.2 MG/DL (ref 0.6–1.3)

## 2023-07-24 PROCEDURE — 6360000004 HC RX CONTRAST MEDICATION: Performed by: UROLOGY

## 2023-07-24 PROCEDURE — 82565 ASSAY OF CREATININE: CPT

## 2023-07-24 PROCEDURE — 74178 CT ABD&PLV WO CNTR FLWD CNTR: CPT

## 2023-07-24 RX ADMIN — IOPAMIDOL 78 ML: 755 INJECTION, SOLUTION INTRAVENOUS at 08:52

## 2023-10-31 ENCOUNTER — TRANSCRIBE ORDERS (OUTPATIENT)
Facility: HOSPITAL | Age: 48
End: 2023-10-31

## 2023-10-31 DIAGNOSIS — Z12.31 SCREENING MAMMOGRAM, ENCOUNTER FOR: Primary | ICD-10-CM

## 2025-02-12 ENCOUNTER — HOSPITAL ENCOUNTER (OUTPATIENT)
Facility: HOSPITAL | Age: 50
Discharge: HOME | End: 2025-02-15
Payer: MEDICAID

## 2025-02-12 DIAGNOSIS — N28.9 RENAL LESION: ICD-10-CM

## 2025-02-12 DIAGNOSIS — D17.71 ANGIOMYOLIPOMA OF RIGHT KIDNEY: ICD-10-CM

## 2025-02-12 PROCEDURE — 76770 US EXAM ABDO BACK WALL COMP: CPT
